# Patient Record
Sex: FEMALE | Race: BLACK OR AFRICAN AMERICAN | NOT HISPANIC OR LATINO | URBAN - METROPOLITAN AREA
[De-identification: names, ages, dates, MRNs, and addresses within clinical notes are randomized per-mention and may not be internally consistent; named-entity substitution may affect disease eponyms.]

---

## 2023-03-02 ENCOUNTER — TELEPHONE (OUTPATIENT)
Dept: AUDIOLOGY | Facility: CLINIC | Age: 18
End: 2023-03-02
Payer: COMMERCIAL

## 2023-03-02 NOTE — TELEPHONE ENCOUNTER
Attempted to call patient's cell number to let her know we do not have an order for the hearing test tomorrow. Phone number rang but no voicemail picked up.  Will try home number      Kiarra Santo  Audiologist  [place] License  #[id number]

## 2023-03-02 NOTE — TELEPHONE ENCOUNTER
Attempted to reach patient via Home number listed to let her know we need an order for the hearing test. There was no answer and patient's name was not the name on the voicemail so did not leave a message.     Kiarra Santo  Audiologist  [place] License  #[id number]